# Patient Record
Sex: MALE | Race: WHITE
[De-identification: names, ages, dates, MRNs, and addresses within clinical notes are randomized per-mention and may not be internally consistent; named-entity substitution may affect disease eponyms.]

---

## 2019-12-13 ENCOUNTER — HOSPITAL ENCOUNTER (EMERGENCY)
Dept: HOSPITAL 96 - M.ERS | Age: 49
Discharge: LEFT BEFORE DISCHARGE | End: 2019-12-13
Payer: COMMERCIAL

## 2019-12-13 VITALS — DIASTOLIC BLOOD PRESSURE: 67 MMHG | SYSTOLIC BLOOD PRESSURE: 105 MMHG

## 2019-12-13 VITALS — BODY MASS INDEX: 25.9 KG/M2 | HEIGHT: 67 IN | WEIGHT: 164.99 LBS

## 2019-12-13 DIAGNOSIS — F41.9: ICD-10-CM

## 2019-12-13 DIAGNOSIS — R07.89: Primary | ICD-10-CM

## 2019-12-13 DIAGNOSIS — R00.0: ICD-10-CM

## 2019-12-13 LAB
ABSOLUTE BASOPHILS: 0.1 THOU/UL (ref 0–0.2)
ABSOLUTE EOSINOPHILS: 0.4 THOU/UL (ref 0–0.7)
ABSOLUTE MONOCYTES: 1 THOU/UL (ref 0–1.2)
ALBUMIN SERPL-MCNC: 3.9 G/DL (ref 3.4–5)
ALP SERPL-CCNC: 76 U/L (ref 46–116)
ALT SERPL-CCNC: 26 U/L (ref 30–65)
ANION GAP SERPL CALC-SCNC: 11 MMOL/L (ref 7–16)
APTT BLD: 26.5 SECONDS (ref 25–31.3)
AST SERPL-CCNC: 26 U/L (ref 15–37)
BASOPHILS NFR BLD AUTO: 1.2 %
BILIRUB SERPL-MCNC: 0.4 MG/DL
BUN SERPL-MCNC: 22 MG/DL (ref 7–18)
CALCIUM SERPL-MCNC: 8.9 MG/DL (ref 8.5–10.1)
CHLORIDE SERPL-SCNC: 102 MMOL/L (ref 98–107)
CO2 SERPL-SCNC: 26 MMOL/L (ref 21–32)
CREAT SERPL-MCNC: 1.3 MG/DL (ref 0.6–1.3)
EOSINOPHIL NFR BLD: 4.1 %
GLUCOSE SERPL-MCNC: 118 MG/DL (ref 70–99)
GRANULOCYTES NFR BLD MANUAL: 47 %
HCT VFR BLD CALC: 44.6 % (ref 42–52)
HGB BLD-MCNC: 15.3 GM/DL (ref 14–18)
INR PPP: 1.1
LIPASE: 127 U/L (ref 73–393)
LYMPHOCYTES # BLD: 3.5 THOU/UL (ref 0.8–5.3)
LYMPHOCYTES NFR BLD AUTO: 37.3 %
MCH RBC QN AUTO: 32.1 PG (ref 26–34)
MCHC RBC AUTO-ENTMCNC: 34.3 G/DL (ref 28–37)
MCV RBC: 93.6 FL (ref 80–100)
MONOCYTES NFR BLD: 10.4 %
MPV: 7.7 FL. (ref 7.2–11.1)
NEUTROPHILS # BLD: 4.4 THOU/UL (ref 1.6–8.1)
NUCLEATED RBCS: 0 /100WBC
PLATELET COUNT*: 236 THOU/UL (ref 150–400)
POTASSIUM SERPL-SCNC: 3.7 MMOL/L (ref 3.5–5.1)
PROT SERPL-MCNC: 7.8 G/DL (ref 6.4–8.2)
PROTHROMBIN TIME: 11.2 SECONDS (ref 9.2–11.5)
RBC # BLD AUTO: 4.76 MIL/UL (ref 4.5–6)
RDW-CV: 13.7 % (ref 10.5–14.5)
SODIUM SERPL-SCNC: 139 MMOL/L (ref 136–145)
WBC # BLD AUTO: 9.3 THOU/UL (ref 4–11)

## 2019-12-13 NOTE — EKG
[place] [address]
Phone:  [phone]                     ELECTROCARDIOGRAM REPORT      
_______________________________________________________________________________
 
Name:       JOMAR CAPELLAN                Room:                      [facility]#:  [id number]      Account #:      [id number]  
Admission:  19     Attend Phys:                         
Discharge:  19     Date of Birth:  [birth year]  
         Report #: [id number]
    [id number]
_______________________________________________________________________________
THIS REPORT FOR:  //name//                      
 
                         [facility] ED
                                       
Test Date:    2019               Test Time:    03:00:13
Pat Name:     JOMAR CAPELLAN            Department:   
Patient ID:   [id number]            Room:          
Gender:                               Technician:   [place]
:          1970               Requested By: Samina Tejeda
Order Number: [id number] MD:   Tayo Loco
                                 Measurements
Intervals                              Axis          
Rate:         180                      P:            0
ID:                                    QRS:          85
QRSD:         87                       T:            60
QT:           280                                    
QTc:          485                                    
                           Interpretive Statements
Supraventricular tachycardia
ST depression, probably rate related
No previous ECG available for comparison
 
Electronically Signed On 2019 10:32:50 CST by Tayo Loco
[email or web address]
 
 
 
 
 
 
 
 
 
 
 
 
 
 
 
 
 
 
 
  <ELECTRONICALLY SIGNED>
                                           By: Tayo Loco MD, [facility]      
  19     1032
D: 19 0300   _____________________________________
T: 19 0300   Tayo Loco MD, FACC        /EPI

## 2020-05-28 ENCOUNTER — HOSPITAL ENCOUNTER (EMERGENCY)
Dept: HOSPITAL 96 - M.ERS | Age: 50
Discharge: HOME | End: 2020-05-28
Payer: COMMERCIAL

## 2020-05-28 VITALS — HEIGHT: 66 IN | WEIGHT: 152.01 LBS | BODY MASS INDEX: 24.43 KG/M2

## 2020-05-28 VITALS — SYSTOLIC BLOOD PRESSURE: 100 MMHG | DIASTOLIC BLOOD PRESSURE: 66 MMHG

## 2020-05-28 DIAGNOSIS — R00.2: Primary | ICD-10-CM

## 2020-05-28 LAB
ABSOLUTE BASOPHILS: 0.1 THOU/UL (ref 0–0.2)
ABSOLUTE EOSINOPHILS: 0.4 THOU/UL (ref 0–0.7)
ABSOLUTE MONOCYTES: 0.9 THOU/UL (ref 0–1.2)
ALBUMIN SERPL-MCNC: 3.7 G/DL (ref 3.4–5)
ALP SERPL-CCNC: 76 U/L (ref 46–116)
ALT SERPL-CCNC: 30 U/L (ref 30–65)
ANION GAP SERPL CALC-SCNC: 6 MMOL/L (ref 7–16)
AST SERPL-CCNC: 35 U/L (ref 15–37)
BASOPHILS NFR BLD AUTO: 0.7 %
BILIRUB SERPL-MCNC: 0.2 MG/DL
BUN SERPL-MCNC: 19 MG/DL (ref 7–18)
CALCIUM SERPL-MCNC: 8.2 MG/DL (ref 8.5–10.1)
CHLORIDE SERPL-SCNC: 104 MMOL/L (ref 98–107)
CO2 SERPL-SCNC: 32 MMOL/L (ref 21–32)
CREAT SERPL-MCNC: 1.2 MG/DL (ref 0.6–1.3)
EOSINOPHIL NFR BLD: 5 %
GLUCOSE SERPL-MCNC: 90 MG/DL (ref 70–99)
GRANULOCYTES NFR BLD MANUAL: 48.6 %
HCT VFR BLD CALC: 43.7 % (ref 42–52)
HGB BLD-MCNC: 15.2 GM/DL (ref 14–18)
LIPASE: 200 U/L (ref 73–393)
LYMPHOCYTES # BLD: 3.1 THOU/UL (ref 0.8–5.3)
LYMPHOCYTES NFR BLD AUTO: 35.7 %
MAGNESIUM SERPL-MCNC: 2.2 MG/DL (ref 1.8–2.4)
MCH RBC QN AUTO: 32.6 PG (ref 26–34)
MCHC RBC AUTO-ENTMCNC: 34.7 G/DL (ref 28–37)
MCV RBC: 94.2 FL (ref 80–100)
MONOCYTES NFR BLD: 10 %
MPV: 7.6 FL. (ref 7.2–11.1)
NEUTROPHILS # BLD: 4.3 THOU/UL (ref 1.6–8.1)
NUCLEATED RBCS: 0 /100WBC
PLATELET COUNT*: 268 THOU/UL (ref 150–400)
POTASSIUM SERPL-SCNC: 3.5 MMOL/L (ref 3.5–5.1)
PROT SERPL-MCNC: 7.3 G/DL (ref 6.4–8.2)
RBC # BLD AUTO: 4.64 MIL/UL (ref 4.5–6)
RDW-CV: 14.3 % (ref 10.5–14.5)
SODIUM SERPL-SCNC: 142 MMOL/L (ref 136–145)
WBC # BLD AUTO: 8.8 THOU/UL (ref 4–11)

## 2020-05-28 NOTE — EKG
[place] [address]
Phone:  [phone]                     ELECTROCARDIOGRAM REPORT      
_______________________________________________________________________________
 
Name:         JOMAR CAPELLAN               Room:                     [facility]#:    [id number]     Account #:     [id number]  
Admission:    20    Attend Phys:                     
Discharge:    20    Date of Birth: [birth year]  
Date of Service: 20 0517  Report #:      [id number]
        [id number]
_______________________________________________________________________________
THIS REPORT FOR:  //name//                      
 
                         [facility] ED
                                       
Test Date:    2020               Test Time:    05:17:41
Pat Name:     JOMAR CAPELLAN            Department:   
Patient ID:   [id number]            Room:          
Gender:                               Technician:   
:          1970               Requested By: Xavier Loyola
Order Number: [id number] MD:   Shar Rueda
                                 Measurements
Intervals                              Axis          
Rate:         72                       P:            60
MN:           175                      QRS:          74
QRSD:         90                       T:            61
QT:           379                                    
QTc:          415                                    
                           Interpretive Statements
Sinus rhythm
ST elev, probable normal early repol pattern
Baseline wander in lead(s) V1
Compared to ECG 2019 03:00:13
Supraventricular tachycardia no longer present
ST (T wave) deviation still present
 
Electronically Signed On 2020 8:52:27 CDT by Shar Rueda
[email or web address]
 
 
 
 
 
 
 
 
 
 
 
 
 
 
 
 
 
  <ELECTRONICALLY SIGNED>
                                           By: Shar Rueda MD, [facility]   
  20     0852
D: 20   _____________________________________
T: 20   Shar Rueda MD, [facility]     /EPI

## 2020-05-28 NOTE — EKG
[place] [address]
Phone:  [phone]                     ELECTROCARDIOGRAM REPORT      
_______________________________________________________________________________
 
Name:         JOMAR CAPELLAN               Room:                     [facility]#:    [id number]     Account #:     [id number]  
Admission:    20    Attend Phys:                     
Discharge:    20    Date of Birth: [birth year]  
Date of Service: 20 0249  Report #:      [id number]
        [id number]
_______________________________________________________________________________
THIS REPORT FOR:  //name//                      
 
                         [facility] ED
                                       
Test Date:    2020               Test Time:    02:49:22
Pat Name:     JOMAR CAPELLAN            Department:   
Patient ID:   [id number]            Room:          
Gender:                               Technician:   
:          1970               Requested By: Xavier Loyola
Order Number: [id number] MD:   Shar Rueda
                                 Measurements
Intervals                              Axis          
Rate:         92                       P:            60
LA:           152                      QRS:          74
QRSD:         96                       T:            61
QT:           338                                    
QTc:          419                                    
                           Interpretive Statements
Sinus rhythm
RSR' in V1 or V2, probably normal variant
Compared to ECG 2019 03:00:13
RSR' in V1 or V2 now present
Supraventricular tachycardia no longer present
ST (T wave) deviation no longer present
 
Electronically Signed On 2020 8:50:34 CDT by Shar Rueda
[email or web address]
 
 
 
 
 
 
 
 
 
 
 
 
 
 
 
 
 
  <ELECTRONICALLY SIGNED>
                                           By: Shar Rueda MD, FAC   
  20     0850
D: 20   _____________________________________
T: 20   Shar Rueda MD, FAC     /EPI

## 2021-09-20 ENCOUNTER — HOSPITAL ENCOUNTER (EMERGENCY)
Dept: HOSPITAL 67 - ED | Age: 51
Discharge: HOME | End: 2021-09-20
Payer: COMMERCIAL

## 2021-09-20 VITALS — DIASTOLIC BLOOD PRESSURE: 75 MMHG | SYSTOLIC BLOOD PRESSURE: 123 MMHG

## 2021-09-20 VITALS — WEIGHT: 145 LBS | BODY MASS INDEX: 23.3 KG/M2 | HEIGHT: 66 IN

## 2021-09-20 VITALS — TEMPERATURE: 98 F
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
## 2021-09-20 DIAGNOSIS — G89.29: ICD-10-CM

## 2021-09-20 DIAGNOSIS — M54.5: Primary | ICD-10-CM

## 2021-09-20 DIAGNOSIS — M51.36: ICD-10-CM

## 2021-09-20 DIAGNOSIS — M19.09: ICD-10-CM
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
## 2021-09-20 PROCEDURE — 96372 THER/PROPH/DIAG INJ SC/IM: CPT

## 2021-09-20 PROCEDURE — 99283 EMERGENCY DEPT VISIT LOW MDM: CPT

## 2021-09-22 ENCOUNTER — HOSPITAL ENCOUNTER (OUTPATIENT)
Dept: HOSPITAL 67 - RAD | Age: 51
Discharge: HOME | End: 2021-09-22
Attending: NURSE PRACTITIONER
Payer: COMMERCIAL

## 2021-09-22 DIAGNOSIS — M54.41: Primary | ICD-10-CM
